# Patient Record
Sex: MALE | Race: WHITE | NOT HISPANIC OR LATINO | URBAN - METROPOLITAN AREA
[De-identification: names, ages, dates, MRNs, and addresses within clinical notes are randomized per-mention and may not be internally consistent; named-entity substitution may affect disease eponyms.]

---

## 2023-05-16 PROBLEM — M13.0 POLYARTHRITIS: Status: ACTIVE | Noted: 2023-05-16

## 2023-05-16 PROBLEM — F17.210 TOBACCO DEPENDENCE DUE TO CIGARETTES: Status: ACTIVE | Noted: 2023-05-16

## 2023-05-16 PROBLEM — E55.9 HYPOVITAMINOSIS D: Status: ACTIVE | Noted: 2023-05-16

## 2023-05-16 PROBLEM — M25.50 POLYARTHRALGIA: Status: RESOLVED | Noted: 2023-05-16 | Resolved: 2023-05-16

## 2023-05-16 RX ORDER — ETODOLAC 400 MG/1
1 TABLET, FILM COATED ORAL
COMMUNITY
Start: 2021-03-05 | End: 2023-05-19 | Stop reason: ALTCHOICE

## 2023-05-19 ENCOUNTER — OFFICE VISIT (OUTPATIENT)
Dept: PRIMARY CARE | Facility: CLINIC | Age: 33
End: 2023-05-19
Payer: COMMERCIAL

## 2023-05-19 VITALS
HEART RATE: 74 BPM | BODY MASS INDEX: 21.49 KG/M2 | RESPIRATION RATE: 16 BRPM | SYSTOLIC BLOOD PRESSURE: 110 MMHG | OXYGEN SATURATION: 97 % | WEIGHT: 129 LBS | HEIGHT: 65 IN | DIASTOLIC BLOOD PRESSURE: 66 MMHG

## 2023-05-19 DIAGNOSIS — F33.1 MODERATE EPISODE OF RECURRENT MAJOR DEPRESSIVE DISORDER (MULTI): ICD-10-CM

## 2023-05-19 DIAGNOSIS — K06.8 GUM LESION: ICD-10-CM

## 2023-05-19 DIAGNOSIS — E55.9 VITAMIN D DEFICIENCY: ICD-10-CM

## 2023-05-19 DIAGNOSIS — M21.621 TAILOR'S BUNION OF RIGHT FOOT: ICD-10-CM

## 2023-05-19 DIAGNOSIS — Z00.00 PHYSICAL EXAM, ANNUAL: Primary | ICD-10-CM

## 2023-05-19 DIAGNOSIS — F41.9 SEVERE ANXIETY: ICD-10-CM

## 2023-05-19 PROBLEM — F17.210 TOBACCO DEPENDENCE DUE TO CIGARETTES: Status: RESOLVED | Noted: 2023-05-16 | Resolved: 2023-05-19

## 2023-05-19 PROCEDURE — 1036F TOBACCO NON-USER: CPT | Performed by: NURSE PRACTITIONER

## 2023-05-19 PROCEDURE — 99215 OFFICE O/P EST HI 40 MIN: CPT | Performed by: NURSE PRACTITIONER

## 2023-05-19 PROCEDURE — 99395 PREV VISIT EST AGE 18-39: CPT | Performed by: NURSE PRACTITIONER

## 2023-05-19 RX ORDER — SERTRALINE HYDROCHLORIDE 50 MG/1
50 TABLET, FILM COATED ORAL DAILY
Qty: 30 TABLET | Refills: 1 | Status: SHIPPED | OUTPATIENT
Start: 2023-05-19 | End: 2023-07-18

## 2023-05-19 RX ORDER — HYDROXYZINE HYDROCHLORIDE 25 MG/1
25 TABLET, FILM COATED ORAL 3 TIMES DAILY
Qty: 90 TABLET | Refills: 0 | Status: SHIPPED | OUTPATIENT
Start: 2023-05-19 | End: 2023-06-18

## 2023-05-19 ASSESSMENT — ANXIETY QUESTIONNAIRES
5. BEING SO RESTLESS THAT IT IS HARD TO SIT STILL: MORE THAN HALF THE DAYS
6. BECOMING EASILY ANNOYED OR IRRITABLE: NEARLY EVERY DAY
1. FEELING NERVOUS, ANXIOUS, OR ON EDGE: NEARLY EVERY DAY
7. FEELING AFRAID AS IF SOMETHING AWFUL MIGHT HAPPEN: MORE THAN HALF THE DAYS
4. TROUBLE RELAXING: NEARLY EVERY DAY
2. NOT BEING ABLE TO STOP OR CONTROL WORRYING: NEARLY EVERY DAY
3. WORRYING TOO MUCH ABOUT DIFFERENT THINGS: NEARLY EVERY DAY
GAD7 TOTAL SCORE: 19

## 2023-05-19 ASSESSMENT — PATIENT HEALTH QUESTIONNAIRE - PHQ9
5. POOR APPETITE OR OVEREATING: MORE THAN HALF THE DAYS
2. FEELING DOWN, DEPRESSED OR HOPELESS: MORE THAN HALF THE DAYS
4. FEELING TIRED OR HAVING LITTLE ENERGY: MORE THAN HALF THE DAYS
6. FEELING BAD ABOUT YOURSELF - OR THAT YOU ARE A FAILURE OR HAVE LET YOURSELF OR YOUR FAMILY DOWN: MORE THAN HALF THE DAYS
3. TROUBLE FALLING OR STAYING ASLEEP OR SLEEPING TOO MUCH: MORE THAN HALF THE DAYS
7. TROUBLE CONCENTRATING ON THINGS, SUCH AS READING THE NEWSPAPER OR WATCHING TELEVISION: MORE THAN HALF THE DAYS
8. MOVING OR SPEAKING SO SLOWLY THAT OTHER PEOPLE COULD HAVE NOTICED. OR THE OPPOSITE, BEING SO FIGETY OR RESTLESS THAT YOU HAVE BEEN MOVING AROUND A LOT MORE THAN USUAL: MORE THAN HALF THE DAYS
9. THOUGHTS THAT YOU WOULD BE BETTER OFF DEAD, OR OF HURTING YOURSELF: MORE THAN HALF THE DAYS

## 2023-05-19 ASSESSMENT — ENCOUNTER SYMPTOMS
CONFUSION: 0
DYSPHORIC MOOD: 1
NERVOUS/ANXIOUS: 1
HALLUCINATIONS: 0
DECREASED CONCENTRATION: 1
HYPERACTIVE: 1
SLEEP DISTURBANCE: 1
AGITATION: 1

## 2023-05-19 NOTE — PROGRESS NOTES
Establish new -discuss anxiety, spot on foot, and spot in mouth on inside of cheek  Shooting pain in both arms that radiates up arm. Comes and goes.

## 2023-05-19 NOTE — PROGRESS NOTES
"Subjective   Patient ID: Satinder Kendall is a 32 y.o. male who presents for No chief complaint on file..    Patient is coming in for annual physical exam and presents with multiple complaints which includes worsening anxiety and depressive symptoms, a lesion in his mouth and his partner on his right foot.  Patient was last seen by me in March 2021 with recommendation to consult with psychiatrist for anxiety and depression but he failed to follow-up.  During December appointment, he was referred to consult with an oral surgeon for the growth in the left side of his mouth and he also failed to follow-up.  He reports today that his symptoms of gotten worse and is very concerned about his heart.  Reports that he quit smoking.  Reports that he started working.  He is a .  He lives with his fiancée and her 2 kids (a 6 y/o and a 5y/o). There is a spot on the outside of his right pinky toe that rubs against his shoes resulting in pain. He has failed shoe inserts and cushioning of the spot.          Review of Systems   HENT:          As in ENT   Psychiatric/Behavioral:  Positive for agitation, decreased concentration, dysphoric mood and sleep disturbance. Negative for behavioral problems, confusion, hallucinations, self-injury and suicidal ideas. The patient is nervous/anxious and is hyperactive.    All other systems reviewed and are negative.      Objective   /66   Pulse 74   Resp 16   Ht 1.651 m (5' 5\")   Wt 58.5 kg (129 lb)   SpO2 97%   BMI 21.47 kg/m²     Physical Exam  Constitutional:       Appearance: Normal appearance. He is normal weight.   HENT:      Head: Normocephalic and atraumatic.      Right Ear: Tympanic membrane, ear canal and external ear normal.      Left Ear: Tympanic membrane, ear canal and external ear normal.      Nose: Nose normal.      Mouth/Throat:      Mouth: Mucous membranes are moist.      Comments: Large growth on the left side of buccal mucosa  Eyes:      Extraocular Movements: " Extraocular movements intact.      Conjunctiva/sclera: Conjunctivae normal.      Pupils: Pupils are equal, round, and reactive to light.   Cardiovascular:      Rate and Rhythm: Normal rate and regular rhythm.      Pulses: Normal pulses.      Heart sounds: Normal heart sounds.   Pulmonary:      Effort: Pulmonary effort is normal.      Breath sounds: Normal breath sounds.   Abdominal:      General: Abdomen is flat. Bowel sounds are normal.      Palpations: Abdomen is soft.   Musculoskeletal:         General: Normal range of motion.      Cervical back: Normal range of motion and neck supple.      Comments: Protruding of the lateral side of right pinky toe (bunion).    Skin:     General: Skin is warm and dry.      Capillary Refill: Capillary refill takes more than 3 seconds.   Neurological:      General: No focal deficit present.      Mental Status: He is alert and oriented to person, place, and time.   Psychiatric:         Thought Content: Thought content normal.         Judgment: Judgment normal.      Comments: Fidgety.  LOCO - 7 score equals 19, which indicates severe anxiety. PHQ  - 9 score equals 16 which indicates measure depressive disorder         Assessment/Plan   Problem List Items Addressed This Visit    None

## 2023-05-19 NOTE — PATIENT INSTRUCTIONS
I have referred you to consult with an oral surgeon for the growth in your left gum region. I have prescribed Zoloft and hydroxyzine for your anxiety and depression with referral to consult with a psychiatrist. I recommend that you consult with a foot doctor for town spots on the side of your right pinky toe area. Complete labs labs prior to follow up in 2 months.

## 2024-09-17 ENCOUNTER — HOSPITAL ENCOUNTER (EMERGENCY)
Facility: HOSPITAL | Age: 34
Discharge: HOME | End: 2024-09-17
Payer: COMMERCIAL

## 2024-09-17 VITALS
HEIGHT: 65 IN | OXYGEN SATURATION: 100 % | WEIGHT: 130 LBS | RESPIRATION RATE: 18 BRPM | BODY MASS INDEX: 21.66 KG/M2 | TEMPERATURE: 98.2 F | HEART RATE: 86 BPM

## 2024-09-17 DIAGNOSIS — S51.812A LACERATION OF LEFT FOREARM, INITIAL ENCOUNTER: Primary | ICD-10-CM

## 2024-09-17 PROCEDURE — 12001 RPR S/N/AX/GEN/TRNK 2.5CM/<: CPT

## 2024-09-17 PROCEDURE — 2500000001 HC RX 250 WO HCPCS SELF ADMINISTERED DRUGS (ALT 637 FOR MEDICARE OP): Performed by: NURSE PRACTITIONER

## 2024-09-17 PROCEDURE — 90715 TDAP VACCINE 7 YRS/> IM: CPT | Performed by: NURSE PRACTITIONER

## 2024-09-17 PROCEDURE — 99282 EMERGENCY DEPT VISIT SF MDM: CPT

## 2024-09-17 PROCEDURE — 2500000004 HC RX 250 GENERAL PHARMACY W/ HCPCS (ALT 636 FOR OP/ED): Performed by: NURSE PRACTITIONER

## 2024-09-17 PROCEDURE — 90471 IMMUNIZATION ADMIN: CPT | Performed by: NURSE PRACTITIONER

## 2024-09-17 PROCEDURE — 99283 EMERGENCY DEPT VISIT LOW MDM: CPT

## 2024-09-17 PROCEDURE — 2500000005 HC RX 250 GENERAL PHARMACY W/O HCPCS: Performed by: NURSE PRACTITIONER

## 2024-09-17 RX ORDER — LIDOCAINE HYDROCHLORIDE 10 MG/ML
5 INJECTION, SOLUTION INFILTRATION; PERINEURAL ONCE
Status: COMPLETED | OUTPATIENT
Start: 2024-09-17 | End: 2024-09-17

## 2024-09-17 RX ORDER — BACITRACIN ZINC 500 UNIT/G
OINTMENT IN PACKET (EA) TOPICAL ONCE
Status: COMPLETED | OUTPATIENT
Start: 2024-09-17 | End: 2024-09-17

## 2024-09-17 ASSESSMENT — PAIN DESCRIPTION - ORIENTATION: ORIENTATION: LEFT

## 2024-09-17 ASSESSMENT — PAIN DESCRIPTION - LOCATION: LOCATION: ARM

## 2024-09-17 ASSESSMENT — COLUMBIA-SUICIDE SEVERITY RATING SCALE - C-SSRS
6. HAVE YOU EVER DONE ANYTHING, STARTED TO DO ANYTHING, OR PREPARED TO DO ANYTHING TO END YOUR LIFE?: NO
2. HAVE YOU ACTUALLY HAD ANY THOUGHTS OF KILLING YOURSELF?: NO
1. IN THE PAST MONTH, HAVE YOU WISHED YOU WERE DEAD OR WISHED YOU COULD GO TO SLEEP AND NOT WAKE UP?: NO

## 2024-09-17 ASSESSMENT — PAIN DESCRIPTION - PAIN TYPE: TYPE: ACUTE PAIN

## 2024-09-17 ASSESSMENT — LIFESTYLE VARIABLES
EVER FELT BAD OR GUILTY ABOUT YOUR DRINKING: NO
TOTAL SCORE: 0
HAVE PEOPLE ANNOYED YOU BY CRITICIZING YOUR DRINKING: NO
EVER HAD A DRINK FIRST THING IN THE MORNING TO STEADY YOUR NERVES TO GET RID OF A HANGOVER: NO
HAVE YOU EVER FELT YOU SHOULD CUT DOWN ON YOUR DRINKING: NO

## 2024-09-17 ASSESSMENT — PAIN - FUNCTIONAL ASSESSMENT: PAIN_FUNCTIONAL_ASSESSMENT: 0-10

## 2024-09-17 ASSESSMENT — PAIN SCALES - GENERAL: PAINLEVEL_OUTOF10: 2

## 2024-09-18 NOTE — ED PROVIDER NOTES
"HPI   Chief Complaint   Patient presents with    Laceration     Pt c/o left arm lac tonight - pt states a piece of metal fell off a boat and \"sliced\" his forearm       This is a 33-year-old  male presenting to the emergency room with complaints of a left forearm laceration.  The patient reports that a piece of metal fell off his boat and punctured his arm.  Patient has a laceration noted to the dorsal aspect of the forearm.  Denies any loss of motor function or sensation.  He states it he applied pressure to the cut immediately.  His tetanus is not up-to-date.  The patient is right-hand dominant.  The patient is reporting 2 out of 10 pain to the affected area.      History provided by:  Patient   used: No            Patient History   History reviewed. No pertinent past medical history.  Past Surgical History:   Procedure Laterality Date    ADENOIDECTOMY       Family History   Problem Relation Name Age of Onset    Heart disease Other      Cancer Other       Social History     Tobacco Use    Smoking status: Every Day     Types: Cigarettes    Smokeless tobacco: Never   Vaping Use    Vaping status: Never Used   Substance Use Topics    Alcohol use: Never    Drug use: Never       Physical Exam   ED Triage Vitals [09/17/24 2019]   Temperature Heart Rate Respirations BP   36.8 °C (98.2 °F) 86 18 --      Pulse Ox Temp Source Heart Rate Source Patient Position   100 % Temporal -- --      BP Location FiO2 (%)     -- --       Physical Exam  Vitals and nursing note reviewed.   HENT:      Head: Normocephalic.   Cardiovascular:      Rate and Rhythm: Normal rate and regular rhythm.      Pulses: Normal pulses.      Heart sounds: Normal heart sounds.   Pulmonary:      Effort: Pulmonary effort is normal.      Breath sounds: Normal breath sounds.   Musculoskeletal:         General: Normal range of motion.      Comments: Patient has full range of motion of the left upper extremity.  No laxity noted with full " range of motion of the extremity against resistance.  Hand grasp are strong and equal bilaterally.  Distal extremity brisk cap refill and sensation intact.  Pulses are palpable and strong.   Skin:     General: Skin is warm.      Capillary Refill: Capillary refill takes less than 2 seconds.             Comments: The patient has a 2 cm partial-thickness linear laceration noted to the dorsal aspect of the left forearm.  The wound edges gape slightly.  No active bleeding.  No obvious foreign bodies.   Neurological:      General: No focal deficit present.      Mental Status: He is alert.   Psychiatric:         Mood and Affect: Mood normal.           ED Course & MDM   Diagnoses as of 09/17/24 2319   Laceration of left forearm, initial encounter                 No data recorded     Yasmin Coma Scale Score: 15 (09/17/24 2021 : Kindra Gandhi RN)                           Medical Decision Making  Patient was seen and evaluated by the nurse practitioner, Sayra Cobb.  Patient has a laceration noted to the left forearm.  It is very superficial in nature.  Imaging is not warranted.  The patient was prepared for wound closure.  The patient's tetanus was updated.  The patient was covered in sterile drape.  The wound was cleaned 3 times with Shur-Clens.  Local anesthesia was achieved using  4  mL of 1% lidocaine without epinephrine.  The wound was irrigated with copious amounts of sterile saline and explored.  The wound base was visualized.  No foreign bodies noted.  No tendon injury noted with full range of motion of the extremity.  No bony involvement.  The wound edges were approximated and secured with three 4-0 interrupted Ethilon    sutures .  Bacitracin and a dry sterile dressing were applied.  The patient tolerated the procedure well.  The patient was educated on wound care and signs and symptoms of infection.  The patient is to follow-up with their primary care physician in 7-10 days for wound evaluation and suture  removal.  The patient was discharged in stable condition with computer discharge instructions given.  He is to return if worse in any way.        Procedure  Procedures     Sayra Cobb, KALEE-LOIS  09/17/24 8081